# Patient Record
Sex: MALE | Race: OTHER | Employment: OTHER | URBAN - METROPOLITAN AREA
[De-identification: names, ages, dates, MRNs, and addresses within clinical notes are randomized per-mention and may not be internally consistent; named-entity substitution may affect disease eponyms.]

---

## 2020-11-30 NOTE — PATIENT DISCUSSION
New Prescription: cyclopentolate (cyclopentolate): drops: 1% 1 drop twice a day as directed into right eye 11-

## 2020-11-30 NOTE — PATIENT DISCUSSION
- Discussed warning signs/symptoms of angle closure and advised patient to call immediately with such symptoms

## 2020-11-30 NOTE — PATIENT DISCUSSION
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 IOP in the mid teens.

## 2020-12-04 NOTE — PATIENT DISCUSSION
- Initially with 360 degree posterior synechiae, 4+ cell, iris bombe configuration with angle open to TM on gonioscopy. IOP in the mid teens.

## 2020-12-04 NOTE — PATIENT DISCUSSION
Continue: cyclopentolate (cyclopentolate): drops: 1% 1 drop twice a day as directed into right eye 11-

## 2020-12-04 NOTE — PATIENT DISCUSSION
- Much improved today. Essentially asymptomatic now. Still with moderate AC cell, few synechiae. Iris bombe configuration has resolved, with deep AC.

## 2020-12-09 NOTE — PATIENT DISCUSSION
New Prescription: prednisolone acetate (prednisolone acetate): drops,suspension: 1% 1 drop every two hours while awake as directed into right eye 12-

## 2020-12-09 NOTE — PATIENT DISCUSSION
- Patient is moving to [place] this weekend. Strongly recommend that he find an ophthalmologist or optometrist to see early next week.  Follow up with me PRN

## 2021-12-21 ENCOUNTER — EMERGENCY VISIT (OUTPATIENT)
Dept: URBAN - METROPOLITAN AREA CLINIC 36 | Facility: CLINIC | Age: 66
End: 2021-12-21

## 2021-12-21 DIAGNOSIS — H01.116: ICD-10-CM

## 2021-12-21 DIAGNOSIS — H25.812: ICD-10-CM

## 2021-12-21 DIAGNOSIS — H01.113: ICD-10-CM

## 2021-12-21 DIAGNOSIS — H57.12: ICD-10-CM

## 2021-12-21 DIAGNOSIS — Z97.3: ICD-10-CM

## 2021-12-21 DIAGNOSIS — Z96.1: ICD-10-CM

## 2021-12-21 PROCEDURE — 92012 INTRM OPH EXAM EST PATIENT: CPT

## 2021-12-21 ASSESSMENT — VISUAL ACUITY
OU_SC: 20/30-1
OD_SC: 20/40+2
OS_SC: CF 6FT
OD_PH: 20/25
OS_PH: 20/40

## 2021-12-21 ASSESSMENT — TONOMETRY
OD_IOP_MMHG: 14
OS_IOP_MMHG: 15

## 2022-01-27 ENCOUNTER — COMPREHENSIVE EXAM (OUTPATIENT)
Dept: URBAN - METROPOLITAN AREA CLINIC 36 | Facility: CLINIC | Age: 67
End: 2022-01-27

## 2022-01-27 DIAGNOSIS — H26.491: ICD-10-CM

## 2022-01-27 DIAGNOSIS — H25.812: ICD-10-CM

## 2022-01-27 DIAGNOSIS — Z97.3: ICD-10-CM

## 2022-01-27 DIAGNOSIS — H33.302: ICD-10-CM

## 2022-01-27 DIAGNOSIS — Z96.1: ICD-10-CM

## 2022-01-27 PROCEDURE — 92014 COMPRE OPH EXAM EST PT 1/>: CPT

## 2022-01-27 PROCEDURE — 92015 DETERMINE REFRACTIVE STATE: CPT

## 2022-01-27 ASSESSMENT — VISUAL ACUITY
OD_CC: 20/200
OD_SC: 20/30
OU_SC: J3
OS_SC: J3
OS_BAT: 20/80
OD_PH: 20/25-2
OS_CC: 20/30
OU_SC: 20/30-2
OS_SC: 20/400
OU_CC: 20/30

## 2022-01-27 ASSESSMENT — TONOMETRY
OS_IOP_MMHG: 28
OD_IOP_MMHG: 18
OD_IOP_MMHG: 14
OS_IOP_MMHG: 14

## 2022-01-27 NOTE — PATIENT DISCUSSION
Cataract surgery has been performed in the first eye and activities of daily living are still impaired. The patient would like to proceed with cataract surgery in the second eye as scheduled. The patient elects * O*, goal of *. MATT PT OK TO WEAR READING GLASSES.

## 2022-04-01 ENCOUNTER — CONSULTATION/EVALUATION (OUTPATIENT)
Dept: URBAN - METROPOLITAN AREA CLINIC 39 | Facility: CLINIC | Age: 67
End: 2022-04-01

## 2022-04-01 DIAGNOSIS — H33.302: ICD-10-CM

## 2022-04-01 DIAGNOSIS — H04.123: ICD-10-CM

## 2022-04-01 DIAGNOSIS — H26.491: ICD-10-CM

## 2022-04-01 DIAGNOSIS — Z96.1: ICD-10-CM

## 2022-04-01 DIAGNOSIS — H25.812: ICD-10-CM

## 2022-04-01 PROCEDURE — V2799PMN IMPRIMIS PRED-MOXI-NEPAF 5ML

## 2022-04-01 PROCEDURE — 99214 OFFICE O/P EST MOD 30 MIN: CPT

## 2022-04-01 PROCEDURE — 92136TC INTERFEROMETRY - TECHNICAL COMPONENT

## 2022-04-01 RX ORDER — DUREZOL 0.5 MG/ML: 1 EMULSION OPHTHALMIC

## 2022-04-01 RX ORDER — NEPAFENAC 3 MG/ML: 1 SUSPENSION/ DROPS OPHTHALMIC ONCE A DAY

## 2022-04-01 RX ORDER — MOXIFLOXACIN HYDROCHLORIDE 5 MG/ML: 1 SOLUTION/ DROPS OPHTHALMIC

## 2022-04-01 ASSESSMENT — VISUAL ACUITY
OD_SC: J12
OS_CC: 20/50-2
OD_CC: 20/25-2
OS_SC: 20/400
OS_SC: J1
OS_CC: J1
OS_BAT: <20/400 W/ MR
OD_SC: 20/30-1
OS_AM: 20/25
OD_CC: J1
OD_BAT: 20/40 W/ MR

## 2022-04-01 ASSESSMENT — TONOMETRY
OD_IOP_MMHG: 19
OS_IOP_MMHG: 19

## 2022-04-26 ENCOUNTER — SURGERY/PROCEDURE (OUTPATIENT)
Dept: URBAN - METROPOLITAN AREA CLINIC 39 | Facility: CLINIC | Age: 67
End: 2022-04-26

## 2022-04-26 ENCOUNTER — PRE-OP/H&P (OUTPATIENT)
Dept: URBAN - METROPOLITAN AREA CLINIC 39 | Facility: CLINIC | Age: 67
End: 2022-04-26

## 2022-04-26 ENCOUNTER — SURGERY/PROCEDURE (OUTPATIENT)
Dept: URBAN - METROPOLITAN AREA SURGERY 14 | Facility: SURGERY | Age: 67
End: 2022-04-26

## 2022-04-26 DIAGNOSIS — H25.812: ICD-10-CM

## 2022-04-26 DIAGNOSIS — H26.491: ICD-10-CM

## 2022-04-26 PROCEDURE — 99211T TECH SERVICE

## 2022-04-26 PROCEDURE — 66984 XCAPSL CTRC RMVL W/O ECP: CPT

## 2022-04-26 PROCEDURE — 66821 AFTER CATARACT LASER SURGERY: CPT

## 2022-04-26 NOTE — PROCEDURE NOTE: SURGICAL
<p>Prior to commencing surgery patient identification, surgical procedure, site, and side were confirmed by Dr. Wei Vargas. Following topical proparacaine anesthesia, the patient was positioned at the YAG laser, a contact lens coupled to the cornea with methylcellulose and an axial posterior capsulotomy performed without complication using 4.3 Mj x 13. Excess methylcellulose was washed from the eye, one drop of Alphagan was instilled and the patient returned to the holding area having tolerated the procedure well and without complication. </p><p>MRN: [id number]</p>

## 2022-04-27 ENCOUNTER — POST-OP (OUTPATIENT)
Dept: URBAN - METROPOLITAN AREA CLINIC 36 | Facility: CLINIC | Age: 67
End: 2022-04-27

## 2022-04-27 DIAGNOSIS — Z98.890: ICD-10-CM

## 2022-04-27 DIAGNOSIS — H33.302: ICD-10-CM

## 2022-04-27 DIAGNOSIS — Z96.1: ICD-10-CM

## 2022-04-27 PROCEDURE — 99024 POSTOP FOLLOW-UP VISIT: CPT

## 2022-04-27 ASSESSMENT — VISUAL ACUITY
OD_SC: 20/40
OD_PH: 20/25
OS_SC: 20/25-2
OS_SC: J12
OD_SC: J12

## 2022-04-27 ASSESSMENT — TONOMETRY
OS_IOP_MMHG: 19
OD_IOP_MMHG: 19

## 2022-05-04 ENCOUNTER — POST-OP (OUTPATIENT)
Dept: URBAN - METROPOLITAN AREA CLINIC 36 | Facility: CLINIC | Age: 67
End: 2022-05-04

## 2022-05-04 DIAGNOSIS — Z98.890: ICD-10-CM

## 2022-05-04 DIAGNOSIS — Z96.1: ICD-10-CM

## 2022-05-04 DIAGNOSIS — H33.302: ICD-10-CM

## 2022-05-04 PROCEDURE — 99024 POSTOP FOLLOW-UP VISIT: CPT

## 2022-05-04 ASSESSMENT — VISUAL ACUITY
OS_CC: J1
OS_SC: 20/20
OD_CC: J4
OD_SC: 20/50
OD_PH: 20/25-1

## 2022-05-04 ASSESSMENT — TONOMETRY
OS_IOP_MMHG: 18
OD_IOP_MMHG: 18

## 2022-05-18 ENCOUNTER — POST-OP (OUTPATIENT)
Dept: URBAN - METROPOLITAN AREA CLINIC 36 | Facility: CLINIC | Age: 67
End: 2022-05-18

## 2022-05-18 DIAGNOSIS — Z98.890: ICD-10-CM

## 2022-05-18 DIAGNOSIS — Z96.1: ICD-10-CM

## 2022-05-18 DIAGNOSIS — H17.9: ICD-10-CM

## 2022-05-18 PROCEDURE — 99024 POSTOP FOLLOW-UP VISIT: CPT

## 2022-05-18 ASSESSMENT — VISUAL ACUITY
OD_SC: J12
OD_PH: 20/25-1
OU_SC: 20/20
OD_SC: 20/40
OS_SC: J12
OS_SC: 20/20
OU_SC: J12

## 2022-05-18 ASSESSMENT — TONOMETRY
OS_IOP_MMHG: 16
OD_IOP_MMHG: 18

## 2022-06-29 ENCOUNTER — POST-OP (OUTPATIENT)
Dept: URBAN - METROPOLITAN AREA CLINIC 36 | Facility: CLINIC | Age: 67
End: 2022-06-29

## 2022-06-29 DIAGNOSIS — H04.123: ICD-10-CM

## 2022-06-29 DIAGNOSIS — Z98.890: ICD-10-CM

## 2022-06-29 DIAGNOSIS — H33.302: ICD-10-CM

## 2022-06-29 DIAGNOSIS — Z96.1: ICD-10-CM

## 2022-06-29 PROCEDURE — 99024 POSTOP FOLLOW-UP VISIT: CPT

## 2022-06-29 ASSESSMENT — VISUAL ACUITY
OD_SC: 20/40
OS_SC: 20/25-2
OD_SC: J10
OS_SC: J10
OD_PH: 20/25-1

## 2022-06-29 ASSESSMENT — TONOMETRY
OS_IOP_MMHG: 16
OD_IOP_MMHG: 19

## 2024-06-27 ENCOUNTER — COMPREHENSIVE EXAM (OUTPATIENT)
Dept: URBAN - METROPOLITAN AREA CLINIC 36 | Facility: CLINIC | Age: 69
End: 2024-06-27

## 2024-06-27 DIAGNOSIS — H04.123: ICD-10-CM

## 2024-06-27 DIAGNOSIS — H43.392: ICD-10-CM

## 2024-06-27 DIAGNOSIS — H33.302: ICD-10-CM

## 2024-06-27 DIAGNOSIS — Z98.890: ICD-10-CM

## 2024-06-27 DIAGNOSIS — H35.411: ICD-10-CM

## 2024-06-27 DIAGNOSIS — Z96.1: ICD-10-CM

## 2024-06-27 DIAGNOSIS — H18.591: ICD-10-CM

## 2024-06-27 PROCEDURE — 92014 COMPRE OPH EXAM EST PT 1/>: CPT

## 2024-06-27 ASSESSMENT — VISUAL ACUITY
OD_CC: J1
OD_SC: 20/30-2
OS_CC: J1
OS_SC: 20/20-1

## 2024-06-27 ASSESSMENT — TONOMETRY
OD_IOP_MMHG: 18
OS_IOP_MMHG: 17